# Patient Record
Sex: FEMALE | Race: WHITE | URBAN - METROPOLITAN AREA
[De-identification: names, ages, dates, MRNs, and addresses within clinical notes are randomized per-mention and may not be internally consistent; named-entity substitution may affect disease eponyms.]

---

## 2020-12-29 ENCOUNTER — OFFICE VISIT (OUTPATIENT)
Dept: URBAN - METROPOLITAN AREA CLINIC 96 | Facility: CLINIC | Age: 45
End: 2020-12-29
Payer: MEDICAID

## 2020-12-29 ENCOUNTER — DASHBOARD ENCOUNTERS (OUTPATIENT)
Age: 45
End: 2020-12-29

## 2020-12-29 ENCOUNTER — WEB ENCOUNTER (OUTPATIENT)
Dept: URBAN - METROPOLITAN AREA CLINIC 96 | Facility: CLINIC | Age: 45
End: 2020-12-29

## 2020-12-29 DIAGNOSIS — K57.92 DIVERTICULITIS: ICD-10-CM

## 2020-12-29 DIAGNOSIS — R10.31 RLQ ABDOMINAL PAIN: ICD-10-CM

## 2020-12-29 PROBLEM — 307496006: Status: ACTIVE | Noted: 2020-12-28

## 2020-12-29 PROCEDURE — 99204 OFFICE O/P NEW MOD 45 MIN: CPT | Performed by: INTERNAL MEDICINE

## 2020-12-29 RX ORDER — SODIUM, POTASSIUM,MAG SULFATES 17.5-3.13G
354 ML SOLUTION, RECONSTITUTED, ORAL ORAL
Qty: 354 ML | Refills: 0 | OUTPATIENT
Start: 2020-12-28

## 2020-12-29 NOTE — HPI-OTHER HISTORIES
44 yo female ([employer] member, daughter Kylah) seen at [facility] ER 11/24/2020 c/o sudden onset of severe abdominal pain. Labs with WBC 13.2, Hb 13.9. AST 18, ALT 13, bili total 0.9. CT with diverticulitis noted at the hepatic flexure,  discharged with rx for Cipro and Flagyl (see chart for reviewed records )  Patient denies any prior diverticulitis. Pain resolved, back to baseline. No rectal bleeding, no change in BM. No unintentional weight loss, no f/c. No prior colonoscopy. No family hx of IBD, PGM with colon cancer. Baseline constipation, BM QOD.

## 2020-12-29 NOTE — PHYSICAL EXAM GASTROINTESTINAL
Abdomen ,scars,  soft, nontender, nondistended , no guarding or rigidity , no masses palpable , normal bowel sounds , Liver and Spleen , no hepatomegaly present , no hepatosplenomegaly , liver nontender , spleen not palpable

## 2021-01-19 ENCOUNTER — TELEPHONE ENCOUNTER (OUTPATIENT)
Dept: URBAN - METROPOLITAN AREA CLINIC 92 | Facility: CLINIC | Age: 46
End: 2021-01-19

## 2021-01-19 ENCOUNTER — TELEPHONE ENCOUNTER (OUTPATIENT)
Dept: URBAN - METROPOLITAN AREA CLINIC 98 | Facility: CLINIC | Age: 46
End: 2021-01-19

## 2021-01-21 ENCOUNTER — OFFICE VISIT (OUTPATIENT)
Dept: URBAN - METROPOLITAN AREA SURGERY CENTER 18 | Facility: SURGERY CENTER | Age: 46
End: 2021-01-21
Payer: MEDICAID

## 2021-01-21 DIAGNOSIS — Z87.19 H/O ACUTE PANCREATITIS: ICD-10-CM

## 2021-01-21 PROCEDURE — G8907 PT DOC NO EVENTS ON DISCHARG: HCPCS | Performed by: INTERNAL MEDICINE

## 2021-01-21 PROCEDURE — 45378 DIAGNOSTIC COLONOSCOPY: CPT | Performed by: INTERNAL MEDICINE

## 2021-01-21 RX ORDER — SODIUM, POTASSIUM,MAG SULFATES 17.5-3.13G
354 ML SOLUTION, RECONSTITUTED, ORAL ORAL
Qty: 354 ML | Refills: 0 | Status: ACTIVE | COMMUNITY
Start: 2020-12-28